# Patient Record
Sex: FEMALE
[De-identification: names, ages, dates, MRNs, and addresses within clinical notes are randomized per-mention and may not be internally consistent; named-entity substitution may affect disease eponyms.]

---
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
## 2021-01-04 ENCOUNTER — HOSPITAL ENCOUNTER (OUTPATIENT)
Dept: HOSPITAL 39 - LAB.O | Age: 21
End: 2021-01-04
Attending: OBSTETRICS & GYNECOLOGY
Payer: COMMERCIAL

## 2021-01-04 DIAGNOSIS — Z3A.24: ICD-10-CM

## 2021-01-04 DIAGNOSIS — Z13.0: Primary | ICD-10-CM
